# Patient Record
(demographics unavailable — no encounter records)

---

## 2025-07-28 NOTE — PHYSICAL EXAM
[Normal] : soft, non-tender, non-distended, no masses palpated, no HSM and normal bowel sounds [de-identified] : L > R ankle edema 1+

## 2025-07-28 NOTE — PHYSICAL EXAM
[Normal] : soft, non-tender, non-distended, no masses palpated, no HSM and normal bowel sounds [de-identified] : L > R ankle edema 1+

## 2025-07-28 NOTE — END OF VISIT
[] : Resident [FreeTextEntry3] : 89F w/hx of CVA (cerebellar/lacunar), HTN, DM here for f/u and med refill HTN - has been out of meds, BP elevated on manual as well. refill enalapril today - needs to come back in 1-2 weeks for f/u and BP check. CVA - restart statin, will consider ASA   RTC for BP check and DILIP

## 2025-07-28 NOTE — HEALTH RISK ASSESSMENT
[Little interest or pleasure doing things] : 1) Little interest or pleasure doing things [Feeling down, depressed, or hopeless] : 2) Feeling down, depressed, or hopeless [0] : 2) Feeling down, depressed, or hopeless: Not at all (0) [PHQ-9 Negative - No further assessment needed] : PHQ-9 Negative - No further assessment needed [Never] : Never [[id number]] : 0

## 2025-07-28 NOTE — HEALTH RISK ASSESSMENT
[Little interest or pleasure doing things] : 1) Little interest or pleasure doing things [Feeling down, depressed, or hopeless] : 2) Feeling down, depressed, or hopeless [0] : 2) Feeling down, depressed, or hopeless: Not at all (0) [PHQ-9 Negative - No further assessment needed] : PHQ-9 Negative - No further assessment needed [Never] : Never [[id number]] : 0

## 2025-07-28 NOTE — REVIEW OF SYSTEMS
[Palpitations] : palpitations [Nausea] : nausea [Fever] : no fever [Chills] : no chills [Chest Pain] : no chest pain [Shortness Of Breath] : no shortness of breath [Cough] : no cough [FreeTextEntry7] : occasional LLQ pain, loss of appetite, occasional nausea

## 2025-07-28 NOTE — HISTORY OF PRESENT ILLNESS
Assessment  1  Neck pain (723 1) (M54 2)   2  Occipital neuralgia of right side (723 8) (M54 81)   3  Cervical myofascial pain syndrome (729 1) (M79 1)    Plan  Cervical myofascial pain syndrome, Neck pain, Occipital neuralgia of right side    · *1 - [facility]  Eval and treat for occipital neuralgia 1-2 x  week for 8-12 weeks utilizing myofascial release, stim, u/s, and geared towards a HE &  Stretching program   Status: Active  Requested for: 29CSC4957   Ordered; For: Cervical myofascial pain syndrome, Neck pain, Occipital neuralgia of right side; Ordered By: Leland Baxter Performed:  Due: 19NCG3899  Care Summary provided  : Yes  Neck pain    · PredniSONE 10 MG Oral Tablet   Rx By: Amber Rouse; Dispense: 7 Days ; #:21 Tablet; Refill: 0;For: Neck pain; SUSHMA = N; Verified Transmission to [address] [place] [address]; Last Updated By: Anne Marie Dai; 10/3/2017 8:24:44 AM  Occipital neuralgia of right side    · Follow-up PRN Evaluation and Treatment  Follow-up  Status: Complete  Done:  30CBY8230   Ordered; For: Occipital neuralgia of right side; Ordered By: Leland Baxter Performed:  Due: 08ZQW2415  PMH: Neck pain on right side    · Methocarbamol 500 MG Oral Tablet   Rx By: Amber Rouse; Dispense: 10 Days ; #:30 Tablet; Refill: 1;For: PMH: Neck pain on right side; SUSHMA = N; Record; Last Updated By: Leland Baxter; 10/3/2017 8:50:24 AM   · Naproxen 500 MG Oral Tablet   Rx By: Joaquim Juarez; Dispense: 15 Days ; #:30 Tablet; Refill: 1;For: PMH: Neck pain on right side; SUSHMA = N; Verified Transmission to [address] [place] [address]; Last Updated By: Anne Marie Dai; 10/3/2017 8:24:39 AM    Discussion/Summary    While the patient was in the office today, I did have a thorough conversation with the patient regarding her medication regimen treatment plan options   I explained to the patient at this point time since I do feel that there is a significant inflammatory component, especially since the 1st few days of the prednisone taper was helpful, that it would be beneficial to proceed with the right occipital nerve block with Dr Marita Garza  The patient was agreeable and verbalized an understanding  Complete risks and benefits including bleeding, infection, tissue reaction, nerve injury and allergic reaction were discussed  The approach was demonstrated using models and literature was provided  Verbal and written consent was obtained  did advise the patient that unfortunately we may never know exactly what caused the and that I feel that stretching and exercise with physical therapy would be beneficial  I did give her prescription for physical therapy geared towards myofascial treatment and home exercise and stretching program  I advised the patient to wait at least a week after the nerve block before she started the therapy  explained to the patient that since she does not like the way the methocarbamol makes her feel and does not provide relief, I advised her to discontinue the methocarbamol  At this point we decided to hold off any other medications are not something new, however, if she does not obtain relief from the occipital nerve block, we may consider a neuropathic medications such as gabapentin or Cymbalta in the future  However, this point the patient like to hold off on any medications if possible  discussed with the patient that at this point time she can followup with our office on an as-needed basis  I did review the patient that if her pain symptoms should change, worsen, and/or if she would experience any new symptoms as she would like to be evaluated for, she should give our office a call  The patient was agreeable and verbalized an understanding  The patient has the current Goals: To proceed with a right occipital nerve block with Dr Marita Garza and physical therapy  The patent has the current Barriers: None  Patient is able to Self-Care  The treatment plan was reviewed with the patient/guardian  The patient/guardian understands and agrees with the treatment plan   The patient was counseled regarding instructions for management,-- prognosis,-- patient and family education,-- impressions,-- risks and benefits of treatment options-- and-- importance of compliance with treatment  total time of encounter was 25 minutes  Chief Complaint  1  Pain  Right sided head, neck, and occipital pain  History of Present Illness  The patient presents today for follow up office visit  She was seen for her initial consultation and evaluation of her right-sided head, neck, and occipital pain with Dr Sonia Cleaning on September 19, 2017  Since that office visit the patient did proceed with the titrating dose of oral prednisone, which she reports helped initially in the 1st few days of the steroid taper, however, very quickly stopped working and the pain returned back to its baseline  She has been using the methocarbamol, with very minimal relief and does not like the way that it makes her feel  She presents today to discuss the possibility of proceeding with a right occipital nerve block with Dr Sonia Cleaning as she had many questions about whether not physical therapy would be helpful and had a lot of questions about the occipital nerve block  She also had a lot of questions about because of her occipital neuralgia and how to prevent in the future  LIAM ALFONSO presents with complaints of intermittent episodes of moderate head and r>l pain, described as dull, aching and throbbing, radiating to the head  On a scale of 1 to 10, the patient rates the pain as 6  Symptoms are unchanged  Review of Systems    Constitutional: no fever,-- no recent weight gain-- and-- no recent weight loss  Eyes: no double vision-- and-- no blurry vision  Cardiovascular: no chest pain,-- no palpitations-- and-- no lower extremity edema  Respiratory: no complaints of shortness of breath-- and-- no wheezing     Musculoskeletal: joint stiffness, but-- no difficulty walking,-- no muscle weakness,-- no joint swelling,-- no limb swelling,-- no pain in extremity-- and-- no decreased range of motion  Neurological: no dizziness,-- no difficulty swallowing,-- no memory loss,-- no loss of consciousness-- and-- no seizures  Gastrointestinal: no nausea,-- no vomiting,-- no constipation-- and-- no diarrhea  Genitourinary: no difficulty initiating urine stream,-- no genital pain-- and-- no frequent urination  Integumentary: no complaints of skin rash  Psychiatric: no depression  Endocrine: no excessive thirst,-- no adrenal disease,-- no hypothyroidism-- and-- no hyperthyroidism  Hematologic/Lymphatic: no tendency for easy bruising-- and-- no tendency for easy bleeding  Active Problems  1  Hyperlipidemia (272 4) (E78 5)   2  Neck pain (723 1) (M54 2)   3  Occipital neuralgia of right side (723 8) (M54 81)   4  Palpitations (785 1) (R00 2)   5  Visit for screening mammogram (V76 12) (Z12 31)    Past Medical History  1  History of Effusion of left knee (719 06) (M25 462)   2  History of Hamstring strain (843 8) (S76 319A)   3  History of Neck pain on right side (723 1) (M54 2)   4  History of Tick bite (919 4,E906 4) (W57 XXXA)    The active problems and past medical history were reviewed and updated today  Surgical History  1  History of Appendectomy (47 0)   2  History of Oral Surgery Tooth Extraction [facility]   3  History of Tonsillectomy   4  History of Tubal Ligation    The surgical history was reviewed and updated today  Family History  Mother    1  Family history of Coronary artery disease   2  Family history of arthritis (V17 7) (Z82 61)   3  Family history of cardiac disorder (V17 49) (Z82 49)   4  Family history of transient ischemic attacks (V17 1) (Z82 3)  Father    5  Family history of    6  Family history of myocardial infarction (V17 3) (Z82 49)   7   Family history of sudden cardiac death (SCD) (V17 41) (Z82 41)    The family history was reviewed and updated today  Social History   · Minimum alcohol consumption   · Never a smoker  The social history was reviewed and updated today  The social history was reviewed and is unchanged  Current Meds   1  Advil TABS; Therapy: (Recorded:66Nsr3637) to Recorded   2  Atorvastatin Calcium 10 MG Oral Tablet; take 1 tablet every other day  Requested for:   30ODB3370; Last Rx:22Hsv0032 Ordered   3  Calcium 600 + D TABS; TAKE 1 TABLET DAILY; Therapy: (Doyce Bio) to Recorded   4  Fish Oil 1200 MG Oral Capsule; Take 2 tablets daily; Therapy: (Doyce Bio) to Recorded   5  Methocarbamol 500 MG Oral Tablet; TAKE 1 TABLET 3 times daily; Therapy: 17Rzo6389 to (Complete:09Oct2017)  Requested for: 82Mvv8508; Last   Rx:78Hhl6142 Ordered   6  Naproxen 500 MG Oral Tablet; TAKE 1 TABLET EVERY 12 HOURS WITH FOOD AS   NEEDED; Therapy: 93Pwp0322 to (Evaluate:49Pvr2108)  Requested for: 04Wte1105; Last   Rx:96Fkj2784 Ordered   7  PredniSONE 10 MG Oral Tablet; Take as directed according to info sheet given at office    Requested for: 41Wjw9947; Last Rx:09Wgk1548 Ordered   8  Triamterene-HCTZ 37 5-25 MG Oral Capsule; TAKE 1/2 CAPSULE DAILY AS NEEDED    Requested for: 21Apr2017; Last Rx:31Gvt8354 Ordered   9  Vitamin D 1000 UNIT Oral Tablet; TAKE 1 TABLET DAILY; Therapy: (Doyce Bio) to Recorded    The medication list was reviewed and updated today  Allergies  1  No Known Drug Allergies    Vitals  Vital Signs    Recorded: 11FXE2680 08:23AM   Temperature 98 1 F   Heart Rate 60   Systolic 686   Diastolic 80   Height 5 ft 4 7 in   Weight 107 lb    BMI Calculated 17 97   BSA Calculated 1 51   Pain Scale 6     Physical Exam    Constitutional   General appearance: Well developed, well nourished, alert, in no distress, non-toxic and no overt pain behavior  Eyes   Sclera: anicteric   HEENT   Hearing grossly intact      Neck   Neck: Abnormal  -- Significant tenderness over the right occipital nerve  Pulmonary   Respiratory effort: Even and unlabored  Cardiovascular   Examination of extremities: No edema or pitting edema present  Abdomen   Abdomen: Soft, non-tender, non-distended  Skin   Skin and subcutaneous tissue: Normal without rashes or lesions, well hydrated  Psychiatric   Mood and affect: Mood and affect appropriate  Neurologic   Cranial nerves: Cranial nerves II-XII grossly intact      the muscle tone was normal   Musculoskeletal Tandem Gait: Intact      Future Appointments    Date/Time Provider Specialty Site   10/20/2017 09:30 AM Valdez Amaral DO Family Medicine [address] [facility]   11/13/2017 08:15 AM RACHEL Harvey [facility]   10/12/2017 08:00 AM Edgardo Aggarwal DO Pain Management [facility]     Signatures   Electronically signed by : RACHEL Tello; Oct  3 2017 12:26PM EST                       (Author)    Electronically signed by : Xavier Doe DO; Oct  3 2017  1:01PM EST [FreeTextEntry1] : pt here for follow up [de-identified] : 90 y/o F w/ PMHx diet controlled DM, HTN, CVA presenting for follow up and BP med refill. Patient notes she fell a year ago and she tripped.  She had broken her R arm during this time. She went to hospital after this event and notes her R leg intermittently gets pain. For the past year patient has not been leaving house often. Feels a little imbalanced with some lightheadedness but thinks it is because she doesn't eat well. She has oatmeal, coffee but if there aren't groceries she doesn't cook. Not interested in meal delivery service. Since not taking BP meds not feeling as well, nausea, loss of appetite and lightheadedness. She attributes this to not having BP meds because when she does, she does not feel this way.  Would not like to use cane or walker or PT. Has not had enalapril for at least 1 week. Also not interested in geriatric doctor and would like to continue care here though her daughter is interested.

## 2025-07-28 NOTE — HISTORY OF PRESENT ILLNESS
[FreeTextEntry1] : pt here for follow up [de-identified] : 88 y/o F w/ PMHx diet controlled DM, HTN, CVA presenting for follow up and BP med refill. Patient notes she fell a year ago and she tripped.  She had broken her R arm during this time. She went to hospital after this event and notes her R leg intermittently gets pain. For the past year patient has not been leaving house often. Feels a little imbalanced with some lightheadedness but thinks it is because she doesn't eat well. She has oatmeal, coffee but if there aren't groceries she doesn't cook. Not interested in meal delivery service. Since not taking BP meds not feeling as well, nausea, loss of appetite and lightheadedness. She attributes this to not having BP meds because when she does, she does not feel this way.  Would not like to use cane or walker or PT. Has not had enalapril for at least 1 week. Also not interested in geriatric doctor and would like to continue care here though her daughter is interested.